# Patient Record
(demographics unavailable — no encounter records)

---

## 2025-03-28 NOTE — HISTORY OF PRESENT ILLNESS
[5] : 5 [Sharp] : sharp [Intermittent] : intermittent [Ice] : ice [de-identified] :  03/28/2025: Known Left knee oa did well with csi in 8/2024. Recently pain returned   8.5.24 New patient here for left knee pain. Patient states he works out on the treadmill 3x a week. Last Saturday patient got a blister on the right foot and was favoring the left. By Wednesday patient states he could not put weight on the left side. He states the pain is worse in the morning and the knee locks

## 2025-03-28 NOTE — PROCEDURE
[Large Joint Injection] : Large joint injection [Left] : of the left [Knee] : knee [Pain] : pain [Inflammation] : inflammation [Alcohol] : alcohol [Betadine] : betadine [Ethyl Chloride sprayed topically] : ethyl chloride sprayed topically [Sterile technique used] : sterile technique used [___ cc    6mg] :  Betamethasone (Celestone) ~Vcc of 6mg [] : Patient tolerated procedure well [Call if redness, pain or fever occur] : call if redness, pain or fever occur [Apply ice for 15min out of every hour for the next 12-24 hours as tolerated] : apply ice for 15 minutes out of every hour for the next 12-24 hours as tolerated [Patient was advised to rest the joint(s) for ____ days] : patient was advised to rest the joint(s) for [unfilled] days [Previous OTC use and PT nontherapeutic] : patient has tried OTC's including aspirin, Ibuprofen, Aleve, etc or prescription NSAIDS, and/or exercises at home and/or physical therapy without satisfactory response [Patient had decreased mobility in the joint] : patient had decreased mobility in the joint [Risks, benefits, alternatives discussed / Verbal consent obtained] : the risks benefits, and alternatives have been discussed, and verbal consent was obtained [___ cc    0.5%] : Bupivacaine (Marcaine) ~Vcc of 0.5%  [Effusion] : effusion [de-identified] : 20cc clear yellow

## 2025-03-28 NOTE — PHYSICAL EXAM
[Left] : left knee [NL (0)] : extension 0 degrees [5___] : hamstring 5[unfilled]/5 [] : negative Lachmann [TWNoteComboBox7] : flexion 110 degrees

## 2025-03-28 NOTE — ASSESSMENT
[FreeTextEntry1] : - Will aspirate and administer CSI today for acute pain -Discussed the role of possible HA injections if pain persists [facility]ov[facility] booklet provided -Discussed the role of ice